# Patient Record
Sex: MALE | Race: WHITE
[De-identification: names, ages, dates, MRNs, and addresses within clinical notes are randomized per-mention and may not be internally consistent; named-entity substitution may affect disease eponyms.]

---

## 2017-06-29 ENCOUNTER — HOSPITAL ENCOUNTER (OUTPATIENT)
Dept: HOSPITAL 31 - C.ENDO | Age: 57
Discharge: HOME | End: 2017-06-29
Attending: INTERNAL MEDICINE
Payer: SELF-PAY

## 2017-06-29 VITALS — BODY MASS INDEX: 27.1 KG/M2

## 2017-06-29 VITALS — HEART RATE: 75 BPM | SYSTOLIC BLOOD PRESSURE: 123 MMHG | RESPIRATION RATE: 14 BRPM | DIASTOLIC BLOOD PRESSURE: 88 MMHG

## 2017-06-29 VITALS — OXYGEN SATURATION: 100 %

## 2017-06-29 VITALS — TEMPERATURE: 98.6 F

## 2017-06-29 DIAGNOSIS — D12.5: ICD-10-CM

## 2017-06-29 DIAGNOSIS — K64.0: ICD-10-CM

## 2017-06-29 DIAGNOSIS — Z12.11: Primary | ICD-10-CM

## 2017-06-29 DIAGNOSIS — Z86.010: ICD-10-CM

## 2017-06-29 PROCEDURE — 88305 TISSUE EXAM BY PATHOLOGIST: CPT

## 2017-06-29 PROCEDURE — 45380 COLONOSCOPY AND BIOPSY: CPT

## 2018-07-17 ENCOUNTER — HOSPITAL ENCOUNTER (EMERGENCY)
Dept: HOSPITAL 31 - C.ER | Age: 58
Discharge: HOME | End: 2018-07-17
Payer: COMMERCIAL

## 2018-07-17 VITALS
DIASTOLIC BLOOD PRESSURE: 87 MMHG | TEMPERATURE: 97.6 F | RESPIRATION RATE: 20 BRPM | OXYGEN SATURATION: 97 % | SYSTOLIC BLOOD PRESSURE: 150 MMHG | HEART RATE: 81 BPM

## 2018-07-17 VITALS — BODY MASS INDEX: 27.1 KG/M2

## 2018-07-17 DIAGNOSIS — M25.562: Primary | ICD-10-CM

## 2018-07-17 NOTE — C.PDOC
History Of Present Illness


57 year old male presents to the ER with a complaint of left knee pain for the 

past week. Denies weakness, numbness, or trauma.


Time Seen by Provider: 07/17/18 22:07


Chief Complaint (Nursing): Lower Extremity Problem/Injury


History Per: Patient


History/Exam Limitations: no limitations


Onset/Duration Of Symptoms: Days


Current Symptoms Are (Timing): Still Present


Recent travel outside of the [place]: No





Past Medical History


Reviewed: Historical Data, Nursing Documentation, Vital Signs


Vital Signs: 


 Last Vital Signs











Temp  97.6 F   07/17/18 21:59


 


Pulse  81   07/17/18 21:59


 


Resp  20   07/17/18 21:59


 


BP  150/87   07/17/18 21:59


 


Pulse Ox  97   07/18/18 01:25














- [facility] Procedures








ETHMOIDECTOMY (12/08/99)


INTRANASAL ANTROTOMY (12/08/99)


LYSIS OF NASAL ADHESIONS (12/08/99)


SUBMUC NASAL SEPT RESECT (12/08/99)


TURBINECTOMY NEC (12/08/99)








Family History: States: Unknown Family Hx





- Social History


Hx Alcohol Use: Yes (beer)


Hx Substance Use: No





- Immunization History


Hx Tetanus Toxoid Vaccination: No


Hx Influenza Vaccination: No


Hx Pneumococcal Vaccination: No





Review Of Systems


Musculoskeletal: Positive for: Leg Pain


Neurological: Negative for: Weakness, Numbness





Physical Exam





- Physical Exam


Appears: Non-toxic


Skin: Warm, Dry


Head: Atraumatic, Normacephalic


Eye(s): bilateral: Normal Inspection


Extremity: Normal ROM (with minimal limitation), No Tenderness, Calf Tenderness

, Capillary Refill, No Deformity, Other (Boggy left knee, no joint effusion, 

decreased flexion secondary to pain)


Extremity: Bilateral: Atraumatic, Normal Color And Temperature


Pulses: Left Dorsalis Pedis: Normal, Right Dorsalis Pedis: Normal


Neurological/Psych: Oriented x3, Normal Speech, Normal Motor, Normal Sensation


Gait: Steady





ED Course And Treatment


O2 Sat by Pulse Oximetry: 97 (Room air)


Pulse Ox Interpretation: Normal





- Other Rad


  ** Left knee x-ray


X-Ray: Interpreted by Me, Viewed By Me


Interpretation: No acute fractures or dislocations.


Progress Note: Left knee x-ray and uric acid ordered, results were negative. 

Naproxen administered. Patient reports improvement of pain, he is ambulatory in 

the ER without any difficulty, will place in knee brace for support and advise 

to follow up with PMD.





Disposition


Counseled Patient/Family Regarding: Diagnosis, Need For Followup, Rx Given





- Disposition


Referrals: 


PMD, private doctor [Other]


Disposition: HOME/ ROUTINE


Disposition Time: 22:57


Condition: STABLE


Additional Instructions: 


Leg elevation 





Take naproxen for pain





Return to ER if worse 


Prescriptions: 


Naproxen [Naprosyn] 1 tab PO BID PRN #20 tab


 PRN Reason: Pain


Instructions:  Knee Pain (DC)


Forms:  [facility] (English)





- Clinical Impression


Clinical Impression: 


 Left knee pain, Arthralgia








- PA / NP / Resident Statement


MD/DO has reviewed & agrees with the documentation as recorded.





- Scribe Statement


The provider has reviewed the documentation as recorded by the Scriblorie Gallardo





All medical record entries made by the Arunaiblorie were at my direction and 

personally dictated by me. I have reviewed the chart and agree that the record 

accurately reflects my personal performance of the history, physical exam, 

medical decision making, and the department course for this patient. I have 

also personally directed, reviewed, and agree with the discharge instructions 

and disposition.

## 2018-07-18 NOTE — RAD
Date of service: 



07/17/2018



PROCEDURE:  Left Knee Radiographs.



HISTORY:

Pain.



COMPARISON:

None.



FINDINGS:



BONES:

Normal. No fracture. 



JOINTS:

Normal. No osteoarthritis. 



JOINT EFFUSION:

None. 



OTHER FINDINGS:

None.



IMPRESSION:

Normal radiographs of the left knee.

## 2019-04-23 ENCOUNTER — HOSPITAL ENCOUNTER (OUTPATIENT)
Dept: HOSPITAL 31 - C.CARD | Age: 59
End: 2019-04-23
Payer: COMMERCIAL

## 2019-04-23 DIAGNOSIS — R07.89: Primary | ICD-10-CM

## 2021-05-07 ENCOUNTER — PREPPED CHART (OUTPATIENT)
Dept: URBAN - METROPOLITAN AREA CLINIC 21 | Facility: CLINIC | Age: 61
End: 2021-05-07

## 2021-05-07 PROBLEM — H25.813 COMBINED SENILE CATARACT: Noted: 2021-01-08

## 2021-05-07 PROBLEM — H40.023 GLAUCOMA SUSPECT, HIGH RISK: Noted: 2021-05-07

## 2021-09-16 ENCOUNTER — FOLLOW UP (OUTPATIENT)
Dept: URBAN - METROPOLITAN AREA CLINIC 21 | Facility: CLINIC | Age: 61
End: 2021-09-16

## 2021-09-16 DIAGNOSIS — H40.023: ICD-10-CM

## 2021-09-16 PROCEDURE — 92012 INTRM OPH EXAM EST PATIENT: CPT

## 2021-09-16 PROCEDURE — 92083 EXTENDED VISUAL FIELD XM: CPT

## 2021-09-16 ASSESSMENT — TONOMETRY
OD_IOP_MMHG: 14
OS_IOP_MMHG: 16

## 2021-09-16 ASSESSMENT — VISUAL ACUITY
OD_CC: 20/30
OS_CC: 20/25-2

## 2022-02-11 ENCOUNTER — ESTABLISHED COMPREHENSIVE EXAM (OUTPATIENT)
Dept: URBAN - METROPOLITAN AREA CLINIC 21 | Facility: CLINIC | Age: 62
End: 2022-02-11

## 2022-02-11 DIAGNOSIS — H25.813: ICD-10-CM

## 2022-02-11 DIAGNOSIS — H52.13: ICD-10-CM

## 2022-02-11 DIAGNOSIS — H40.023: ICD-10-CM

## 2022-02-11 PROCEDURE — 92133 CPTRZD OPH DX IMG PST SGM ON: CPT

## 2022-02-11 PROCEDURE — 92015 DETERMINE REFRACTIVE STATE: CPT

## 2022-02-11 PROCEDURE — 92014 COMPRE OPH EXAM EST PT 1/>: CPT

## 2022-02-11 ASSESSMENT — VISUAL ACUITY
OS_CC: 20/25
OU_CC: J2
OD_CC: 20/30+3

## 2022-02-11 ASSESSMENT — TONOMETRY
OS_IOP_MMHG: 18
OD_IOP_MMHG: 20

## 2022-08-12 ENCOUNTER — FOLLOW UP (OUTPATIENT)
Dept: URBAN - METROPOLITAN AREA CLINIC 21 | Facility: CLINIC | Age: 62
End: 2022-08-12

## 2022-08-12 DIAGNOSIS — H40.023: ICD-10-CM

## 2022-08-12 PROCEDURE — 92012 INTRM OPH EXAM EST PATIENT: CPT

## 2022-08-12 PROCEDURE — 92083 EXTENDED VISUAL FIELD XM: CPT

## 2022-08-12 ASSESSMENT — VISUAL ACUITY
OS_CC: 20/25
OU_SC: J1+
OD_CC: 20/30-2

## 2022-08-12 ASSESSMENT — TONOMETRY
OD_IOP_MMHG: 13
OS_IOP_MMHG: 13

## 2023-02-17 ENCOUNTER — ESTABLISHED COMPREHENSIVE EXAM (OUTPATIENT)
Dept: URBAN - METROPOLITAN AREA CLINIC 21 | Facility: CLINIC | Age: 63
End: 2023-02-17

## 2023-02-17 DIAGNOSIS — H25.813: ICD-10-CM

## 2023-02-17 DIAGNOSIS — H52.13: ICD-10-CM

## 2023-02-17 DIAGNOSIS — H40.023: ICD-10-CM

## 2023-02-17 PROCEDURE — 92015 DETERMINE REFRACTIVE STATE: CPT

## 2023-02-17 PROCEDURE — 92014 COMPRE OPH EXAM EST PT 1/>: CPT

## 2023-02-17 ASSESSMENT — VISUAL ACUITY
OU_CC: J1-2
OD_CC: 20/40
OS_CC: 20/30+2

## 2023-02-17 ASSESSMENT — TONOMETRY
OD_IOP_MMHG: 18
OS_IOP_MMHG: 18

## 2023-08-11 ENCOUNTER — ESTABLISHED (OUTPATIENT)
Dept: URBAN - METROPOLITAN AREA CLINIC 21 | Facility: CLINIC | Age: 63
End: 2023-08-11

## 2023-08-11 DIAGNOSIS — H40.023: ICD-10-CM

## 2023-08-11 PROCEDURE — 92012 INTRM OPH EXAM EST PATIENT: CPT

## 2023-08-11 PROCEDURE — 92133 CPTRZD OPH DX IMG PST SGM ON: CPT

## 2023-08-11 ASSESSMENT — TONOMETRY
OD_IOP_MMHG: 16
OS_IOP_MMHG: 16

## 2023-08-11 ASSESSMENT — VISUAL ACUITY
OU_CC: J2
OS_CC: 20/30
OD_CC: 20/50+3

## 2024-02-09 ENCOUNTER — ESTABLISHED COMPREHENSIVE EXAM (OUTPATIENT)
Dept: URBAN - METROPOLITAN AREA CLINIC 21 | Facility: CLINIC | Age: 64
End: 2024-02-09

## 2024-02-09 DIAGNOSIS — H52.13: ICD-10-CM

## 2024-02-09 DIAGNOSIS — H25.813: ICD-10-CM

## 2024-02-09 DIAGNOSIS — H40.023: ICD-10-CM

## 2024-02-09 PROCEDURE — 92015 DETERMINE REFRACTIVE STATE: CPT

## 2024-02-09 PROCEDURE — 92014 COMPRE OPH EXAM EST PT 1/>: CPT

## 2024-02-09 ASSESSMENT — VISUAL ACUITY
OU_SC: J1+
OU_CC: 20/30+3
OS_CC: 20/30+3
OU_CC: J2
OD_CC: 20/40+2

## 2024-02-09 ASSESSMENT — TONOMETRY
OS_IOP_MMHG: 12
OD_IOP_MMHG: 12

## 2025-05-16 ENCOUNTER — ESTABLISHED COMPREHENSIVE EXAM (OUTPATIENT)
Dept: URBAN - METROPOLITAN AREA CLINIC 21 | Facility: CLINIC | Age: 65
End: 2025-05-16

## 2025-05-16 DIAGNOSIS — H52.13: ICD-10-CM

## 2025-05-16 DIAGNOSIS — H40.023: ICD-10-CM

## 2025-05-16 DIAGNOSIS — H25.813: ICD-10-CM

## 2025-05-16 PROCEDURE — 92015 DETERMINE REFRACTIVE STATE: CPT

## 2025-05-16 PROCEDURE — 92014 COMPRE OPH EXAM EST PT 1/>: CPT

## 2025-05-16 ASSESSMENT — VISUAL ACUITY
OU_SC: 20/30-3
OS_SC: 20/30+2
OU_CC: J2
OD_SC: 20/40+2

## 2025-05-16 ASSESSMENT — TONOMETRY
OS_IOP_MMHG: 19
OD_IOP_MMHG: 16